# Patient Record
(demographics unavailable — no encounter records)

---

## 2022-12-01 NOTE — NUR
[facility] FROM SNF TO ER BED 2. AAOX3. NOT IN RESP DISTRESS. BROUGHT IN FOR FEELING 
WEAK AND POOR ORAL INTAKE FOR THE PAST COUPLE OF DAYS. PT WAS SENT BY PMD. PT 
WAS ALSO NOTED WITH R ANKLE SWELLING. MD WAS AT THE BEDSIDE FOR EVAL. ORDERS 
RECEIVED, NOTED AND CARRIED OUT. IV LINE INSERTED ON R HAND 2OG. BLOOD DRAWN 
AND GIVEN TO PHLEB AT BEDSIDE.

## 2022-12-02 NOTE — NUR
PT PICKED UP BY [facility] FOR TRANSPORT BACK TO SNF. REPORT GIVEN TO EMTS 
AND PT CHART AND RESULTS PROVIDED.